# Patient Record
Sex: FEMALE | URBAN - METROPOLITAN AREA
[De-identification: names, ages, dates, MRNs, and addresses within clinical notes are randomized per-mention and may not be internally consistent; named-entity substitution may affect disease eponyms.]

---

## 2018-07-18 ENCOUNTER — OFFICE VISIT (OUTPATIENT)
Dept: URBAN - METROPOLITAN AREA CLINIC 76 | Facility: CLINIC | Age: 53
End: 2018-07-18
Payer: COMMERCIAL

## 2018-07-18 DIAGNOSIS — H52.4 PRESBYOPIA: Primary | ICD-10-CM

## 2018-07-18 PROCEDURE — 92014 COMPRE OPH EXAM EST PT 1/>: CPT | Performed by: OPTOMETRIST

## 2018-07-18 PROCEDURE — 92015 DETERMINE REFRACTIVE STATE: CPT | Performed by: OPTOMETRIST

## 2018-07-18 ASSESSMENT — VISUAL ACUITY
OS: 20/20
OD: 20/20

## 2018-07-18 ASSESSMENT — INTRAOCULAR PRESSURE
OS: 18
OD: 17

## 2020-06-18 ENCOUNTER — OFFICE VISIT (OUTPATIENT)
Dept: URBAN - METROPOLITAN AREA CLINIC 76 | Facility: CLINIC | Age: 55
End: 2020-06-18
Payer: COMMERCIAL

## 2020-06-18 DIAGNOSIS — H35.711 CENTRAL SEROUS CHORIORETINOPATHY, RIGHT EYE: ICD-10-CM

## 2020-06-18 DIAGNOSIS — H04.123 DRY EYE SYNDROME OF BILATERAL LACRIMAL GLANDS: ICD-10-CM

## 2020-06-18 PROCEDURE — 92014 COMPRE OPH EXAM EST PT 1/>: CPT | Performed by: OPTOMETRIST

## 2020-06-18 ASSESSMENT — KERATOMETRY
OD: 44.25
OS: 44.75

## 2020-06-18 ASSESSMENT — INTRAOCULAR PRESSURE
OS: 18
OD: 18

## 2020-06-18 ASSESSMENT — VISUAL ACUITY
OS: 20/20
OD: 20/20

## 2020-06-18 NOTE — IMPRESSION/PLAN
Impression: Diagnosis: Central serous chorioretinopathy, right eye. Code: C96.421. Side: OS. Plan: Rediscussed diagnosis in detail with patient. Will continue to observe condition and or symptoms.